# Patient Record
Sex: FEMALE | URBAN - METROPOLITAN AREA
[De-identification: names, ages, dates, MRNs, and addresses within clinical notes are randomized per-mention and may not be internally consistent; named-entity substitution may affect disease eponyms.]

---

## 2020-03-03 ENCOUNTER — APPOINTMENT (RX ONLY)
Dept: URBAN - METROPOLITAN AREA CLINIC 68 | Facility: CLINIC | Age: 65
Setting detail: DERMATOLOGY
End: 2020-03-03

## 2020-03-03 VITALS — DIASTOLIC BLOOD PRESSURE: 80 MMHG | SYSTOLIC BLOOD PRESSURE: 120 MMHG | HEIGHT: 67 IN | WEIGHT: 145 LBS

## 2020-03-03 DIAGNOSIS — Z41.9 ENCOUNTER FOR PROCEDURE FOR PURPOSES OTHER THAN REMEDYING HEALTH STATE, UNSPECIFIED: ICD-10-CM

## 2020-03-03 PROCEDURE — ? COSMETIC CONSULTATION: GENERAL

## 2020-03-03 PROCEDURE — ? COSMETIC CONSULTATION: MICRONEEDLING

## 2020-03-03 PROCEDURE — ? COSMETIC CONSULTATION: LASER RESURFACING

## 2020-03-27 ENCOUNTER — APPOINTMENT (RX ONLY)
Dept: URBAN - METROPOLITAN AREA CLINIC 68 | Facility: CLINIC | Age: 65
Setting detail: DERMATOLOGY
End: 2020-03-27

## 2020-03-27 VITALS — TEMPERATURE: 97.7 F

## 2020-03-27 DIAGNOSIS — Z41.9 ENCOUNTER FOR PROCEDURE FOR PURPOSES OTHER THAN REMEDYING HEALTH STATE, UNSPECIFIED: ICD-10-CM

## 2020-03-27 PROCEDURE — ? FRACTIONAL ERBIUM ABLATIVE LASER

## 2020-03-27 ASSESSMENT — LOCATION ZONE DERM
LOCATION ZONE: NOSE
LOCATION ZONE: FACE

## 2020-03-27 ASSESSMENT — LOCATION DETAILED DESCRIPTION DERM
LOCATION DETAILED: NASAL DORSUM
LOCATION DETAILED: LEFT CENTRAL MALAR CHEEK
LOCATION DETAILED: RIGHT CHIN
LOCATION DETAILED: RIGHT CENTRAL MALAR CHEEK
LOCATION DETAILED: SUPERIOR MID FOREHEAD

## 2020-03-27 ASSESSMENT — LOCATION SIMPLE DESCRIPTION DERM
LOCATION SIMPLE: LEFT CHEEK
LOCATION SIMPLE: RIGHT CHEEK
LOCATION SIMPLE: CHIN
LOCATION SIMPLE: SUPERIOR FOREHEAD
LOCATION SIMPLE: NOSE

## 2020-03-27 NOTE — PROCEDURE: FRACTIONAL ERBIUM ABLATIVE LASER
Treatment Site: full face
Detail Level: Zone
Post-Care Instructions: I reviewed with the patient in detail post-care instructions. Patient is to apply vaseline with a q-tip to all crusted areas, and avoid picking at any scabs. Pt should stay away from the sun and wear sun protection until fully healed.
Pre-Procedure Text: After consent was obtained and the procedure was explained, topical numbing was applied to the areas to be treated.  Before beginning the procedure, all persons present in the exam room put on their protective eyewear.
Consent: Written consent obtained, risks reviewed including but not limited to crusting, scabbing, blistering, scarring, darker or lighter pigmentary change, and/or incomplete removal.
External Cooling Fan Speed: 5
Procedural Text: The treatment areas where then treated as noted above.  The settings used are identified in the photo of the control panel.
Post-Procedure Text: Following the treatment, cold distilled water and a topical emollient was applied to the treatment area.  Post-care instructions were reviewed and given to the patient.

## 2020-10-13 ENCOUNTER — APPOINTMENT (RX ONLY)
Dept: URBAN - METROPOLITAN AREA CLINIC 68 | Facility: CLINIC | Age: 65
Setting detail: DERMATOLOGY
End: 2020-10-13

## 2020-10-13 DIAGNOSIS — Z41.9 ENCOUNTER FOR PROCEDURE FOR PURPOSES OTHER THAN REMEDYING HEALTH STATE, UNSPECIFIED: ICD-10-CM

## 2020-10-13 PROCEDURE — ? MICRODERMABRASION

## 2020-10-13 ASSESSMENT — LOCATION DETAILED DESCRIPTION DERM
LOCATION DETAILED: RIGHT LATERAL BUCCAL CHEEK
LOCATION DETAILED: RIGHT INFERIOR CENTRAL MALAR CHEEK
LOCATION DETAILED: RIGHT SUPERIOR CENTRAL BUCCAL CHEEK
LOCATION DETAILED: SUBMENTAL CHIN
LOCATION DETAILED: RIGHT INFERIOR FOREHEAD
LOCATION DETAILED: RIGHT INFERIOR MEDIAL MALAR CHEEK
LOCATION DETAILED: LEFT SUPERIOR MEDIAL FOREHEAD
LOCATION DETAILED: RIGHT LATERAL MALAR CHEEK
LOCATION DETAILED: GLABELLA
LOCATION DETAILED: RIGHT LATERAL SUBMANDIBULAR CHEEK
LOCATION DETAILED: RIGHT SUPERIOR ANTERIOR NECK
LOCATION DETAILED: LEFT INFERIOR LATERAL MALAR CHEEK
LOCATION DETAILED: RIGHT CENTRAL ZYGOMA
LOCATION DETAILED: LEFT SUPERIOR CENTRAL MALAR CHEEK
LOCATION DETAILED: LEFT SUPERIOR LATERAL NECK
LOCATION DETAILED: RIGHT NASAL SIDEWALL
LOCATION DETAILED: LEFT LATERAL EYEBROW
LOCATION DETAILED: LEFT INFERIOR MEDIAL MALAR CHEEK
LOCATION DETAILED: RIGHT INFERIOR CENTRAL BUCCAL CHEEK
LOCATION DETAILED: LEFT NASAL SIDEWALL
LOCATION DETAILED: RIGHT CENTRAL LATERAL NECK
LOCATION DETAILED: LEFT LATERAL MALAR CHEEK
LOCATION DETAILED: RIGHT CENTRAL ANTERIOR NECK
LOCATION DETAILED: LEFT INFERIOR CENTRAL BUCCAL CHEEK
LOCATION DETAILED: RIGHT CENTRAL BUCCAL CHEEK
LOCATION DETAILED: RIGHT CENTRAL TEMPLE
LOCATION DETAILED: RIGHT SUPERIOR MEDIAL FOREHEAD
LOCATION DETAILED: LEFT SUPERIOR ANTERIOR NECK
LOCATION DETAILED: LEFT CENTRAL TEMPLE
LOCATION DETAILED: LEFT MEDIAL FOREHEAD
LOCATION DETAILED: LEFT LATERAL FOREHEAD
LOCATION DETAILED: LEFT SUBMANDIBULAR AREA
LOCATION DETAILED: LEFT CENTRAL LATERAL NECK
LOCATION DETAILED: SUPERIOR MID FOREHEAD
LOCATION DETAILED: RIGHT MEDIAL ZYGOMA
LOCATION DETAILED: LEFT CENTRAL ANTERIOR NECK
LOCATION DETAILED: RIGHT LATERAL FOREHEAD
LOCATION DETAILED: LEFT MEDIAL MALAR CHEEK
LOCATION DETAILED: RIGHT LOWER CUTANEOUS LIP
LOCATION DETAILED: RIGHT MENTAL CREASE
LOCATION DETAILED: RIGHT MEDIAL FOREHEAD
LOCATION DETAILED: RIGHT INFERIOR MEDIAL FOREHEAD
LOCATION DETAILED: RIGHT CENTRAL MALAR CHEEK
LOCATION DETAILED: LEFT SUPERIOR CENTRAL BUCCAL CHEEK
LOCATION DETAILED: RIGHT SUBMANDIBULAR AREA
LOCATION DETAILED: LEFT MEDIAL BUCCAL CHEEK

## 2020-10-13 ASSESSMENT — LOCATION SIMPLE DESCRIPTION DERM
LOCATION SIMPLE: SUBMENTAL CHIN
LOCATION SIMPLE: GLABELLA
LOCATION SIMPLE: RIGHT NOSE
LOCATION SIMPLE: LEFT NOSE
LOCATION SIMPLE: RIGHT LIP
LOCATION SIMPLE: RIGHT ANTERIOR NECK
LOCATION SIMPLE: LEFT CHEEK
LOCATION SIMPLE: CHIN
LOCATION SIMPLE: LEFT FOREHEAD
LOCATION SIMPLE: SUPERIOR FOREHEAD
LOCATION SIMPLE: NECK
LOCATION SIMPLE: RIGHT FOREHEAD
LOCATION SIMPLE: LEFT TEMPLE
LOCATION SIMPLE: LEFT EYEBROW
LOCATION SIMPLE: RIGHT ZYGOMA
LOCATION SIMPLE: LEFT SUBMANDIBULAR AREA
LOCATION SIMPLE: RIGHT CHEEK
LOCATION SIMPLE: RIGHT TEMPLE
LOCATION SIMPLE: RIGHT SUBMANDIBULAR AREA
LOCATION SIMPLE: LEFT ANTERIOR NECK

## 2020-10-13 ASSESSMENT — LOCATION ZONE DERM
LOCATION ZONE: NOSE
LOCATION ZONE: FACE
LOCATION ZONE: NECK
LOCATION ZONE: LIP

## 2020-10-13 NOTE — PROCEDURE: MICRODERMABRASION
Number Of Passes: 4
Treatment Number: 1
Post-Care Instructions: I reviewed with the patient in detail post-care instructions. Patient should stay away from the sun and wear sun protection until treated areas are fully healed.
Prep Text: The patient's skin was cleaned and prepped.
Indication: skin texture
Vacuum Pressure: 10
Consent: Written consent obtained, risks reviewed including but not limited to crusting, scabbing, blistering, scarring, darker or lighter pigmentary change, bruising, and/or incomplete response.
Detail Level: Zone

## 2020-10-27 ENCOUNTER — APPOINTMENT (RX ONLY)
Dept: URBAN - METROPOLITAN AREA CLINIC 68 | Facility: CLINIC | Age: 65
Setting detail: DERMATOLOGY
End: 2020-10-27

## 2020-10-27 DIAGNOSIS — Z41.9 ENCOUNTER FOR PROCEDURE FOR PURPOSES OTHER THAN REMEDYING HEALTH STATE, UNSPECIFIED: ICD-10-CM

## 2020-10-27 PROCEDURE — ? DIAMONDGLOW

## 2020-10-27 ASSESSMENT — LOCATION ZONE DERM
LOCATION ZONE: FACE
LOCATION ZONE: EYELID

## 2020-10-27 ASSESSMENT — LOCATION DETAILED DESCRIPTION DERM
LOCATION DETAILED: LEFT MEDIAL SUPERIOR EYELID
LOCATION DETAILED: RIGHT INFERIOR MEDIAL FOREHEAD
LOCATION DETAILED: LEFT MEDIAL MALAR CHEEK

## 2020-10-27 ASSESSMENT — LOCATION SIMPLE DESCRIPTION DERM
LOCATION SIMPLE: RIGHT FOREHEAD
LOCATION SIMPLE: LEFT SUPERIOR EYELID
LOCATION SIMPLE: LEFT CHEEK

## 2020-10-27 NOTE — PROCEDURE: DIAMONDGLOW
Consent: Written consent obtained, risks reviewed including but not limited to crusting, scabbing, blistering, scarring, darker or lighter pigmentary change, bruising, and/or incomplete response.
Facial Treatment Head Used?: Facial: Medium-Fine (100 grit) 6 mm
Intelliflow Setting: 100%
Number Of Passes: 4
Detail Level: Zone
Post-Care Instructions: I reviewed with the patient in detail post-care instructions. Patient should stay away from the sun and wear sun protection until treated areas are fully healed.
Solution Used: Skin Hydrating Solution
Body Treatment Head Used?: Not Used
Vacuum Pressure In Inches: 10

## 2020-11-24 ENCOUNTER — APPOINTMENT (RX ONLY)
Dept: URBAN - METROPOLITAN AREA CLINIC 68 | Facility: CLINIC | Age: 65
Setting detail: DERMATOLOGY
End: 2020-11-24

## 2021-10-19 ENCOUNTER — APPOINTMENT (RX ONLY)
Dept: URBAN - METROPOLITAN AREA CLINIC 68 | Facility: CLINIC | Age: 66
Setting detail: DERMATOLOGY
End: 2021-10-19

## 2021-10-19 DIAGNOSIS — Z41.9 ENCOUNTER FOR PROCEDURE FOR PURPOSES OTHER THAN REMEDYING HEALTH STATE, UNSPECIFIED: ICD-10-CM

## 2021-10-19 PROCEDURE — ? PDO THREAD LIFT

## 2021-10-19 NOTE — PROCEDURE: PDO THREAD LIFT
Consent: The risks and benefits of the procedure were discussed with the patient.  Specifically the risks of swelling, bruising, bleeding, discomfort, infection, damage to nerves, numbness, allergic reactions, pigment changes, partial correction, rippling or dimpling, asymmetry, redness, bumps or nodules, visibility of threads, and scarring were reviewed. After this, consent was obtained.
Postcare Statement Text: There were no complications and the patient was given postcare instructions and ice packs.
Detail Level: Zone
Number Of Threads: 1
Anesthesia Method: local infiltration
Thread Size(S) (Optional): see diagram
Anesthesia Type: 1% lidocaine with epinephrine
Total Anesthesia Volume In Cc (Optional): 0
Post-Care Instructions (For The Patient Hand-Out): I reviewed with the patient in detail post-care instructions. Patient should apply ice packs multiple times a day for a couple days. They were instructed to call if they have any problems.
Procedure Text: An 18 gauge needle was used to create the insertions points and the [employer] needles with absorbable sutures were inserted subcutaneously in each direction and advanced through to the exit points on either side. The threads were then tightened and cut adjacent to the exit points and allowed to retract into the subcutaneous space.
Pre-Procedure Text: The treatment areas were cleaned with alcohol and chlorhexidine. Insertion and exit points were mapped out with the Silhouette ruler and marked with a surgical marker.

## 2021-12-07 ENCOUNTER — APPOINTMENT (RX ONLY)
Dept: URBAN - METROPOLITAN AREA CLINIC 68 | Facility: CLINIC | Age: 66
Setting detail: DERMATOLOGY
End: 2021-12-07

## 2021-12-07 DIAGNOSIS — Z41.9 ENCOUNTER FOR PROCEDURE FOR PURPOSES OTHER THAN REMEDYING HEALTH STATE, UNSPECIFIED: ICD-10-CM

## 2021-12-07 PROCEDURE — ? PDO THREAD LIFT

## 2021-12-07 NOTE — PROCEDURE: PDO THREAD LIFT
Pre-Procedure Text: The treatment areas were cleaned with alcohol and chlorhexidine. Insertion and exit points were mapped out with the Silhouette ruler and marked with a surgical marker.
Thread Size(S) (Optional): see diagram
Detail Level: Zone
Price (Use Numbers Only, No Special Characters Or $): 0
Number Of Threads: 1
Procedure Text: An 18 gauge needle was used to create the insertions points and the [employer] needles with absorbable sutures were inserted subcutaneously in each direction and advanced through to the exit points on either side. The threads were then tightened and cut adjacent to the exit points and allowed to retract into the subcutaneous space.
Consent: The risks and benefits of the procedure were discussed with the patient.  Specifically the risks of swelling, bruising, bleeding, discomfort, infection, damage to nerves, numbness, allergic reactions, pigment changes, partial correction, rippling or dimpling, asymmetry, redness, bumps or nodules, visibility of threads, and scarring were reviewed. After this, consent was obtained.
Anesthesia Method: local infiltration
Postcare Statement Text: There were no complications and the patient was given postcare instructions and ice packs.
Anesthesia Type: 1% lidocaine with epinephrine
Post-Care Instructions (For The Patient Hand-Out): I reviewed with the patient in detail post-care instructions. Patient should apply ice packs multiple times a day for a couple days. They were instructed to call if they have any problems.

## 2023-03-30 ENCOUNTER — APPOINTMENT (RX ONLY)
Dept: URBAN - METROPOLITAN AREA CLINIC 68 | Facility: CLINIC | Age: 68
Setting detail: DERMATOLOGY
End: 2023-03-30

## 2023-03-30 DIAGNOSIS — Z41.9 ENCOUNTER FOR PROCEDURE FOR PURPOSES OTHER THAN REMEDYING HEALTH STATE, UNSPECIFIED: ICD-10-CM

## 2023-03-30 PROCEDURE — ? FILLERS

## 2023-03-30 NOTE — PROCEDURE: FILLERS
Bellafill Syringe Price (Per 0.8 Cc- Numbers Only No Special Characters Or $): 0.00
Cheeks Filler Volume In Cc: 0
Consent: Written consent obtained. Risks include but not limited to bruising, beading, irregular texture, ulceration, infection, allergic reaction, scar formation, incomplete augmentation, temporary nature, procedural pain.
Include Cannula Information In Note?: No
Detail Level: Detailed
Pricing Information: This plan will add up the cumulative amount of filler you document and will bill based on the unit price noted below. For example if you inject 0.9 ccs of Restylane it will bill for one unit. If you inject 1.3 ccs it will bill for two units.
Anesthesia Type: 1% lidocaine with epinephrine
Pre-Treatment: Skin was cleaned with alcohol prior to injections.
Filler: RHA 1
Post-Care Instructions: Patient instructed to apply ice to reduce swelling.
Anesthesia Volume In Cc: 0.5
Map Statment: See Attach Map for Complete Details
Additional Anesthesia Volume In Cc: 6

## 2023-10-19 ENCOUNTER — APPOINTMENT (RX ONLY)
Dept: URBAN - METROPOLITAN AREA CLINIC 68 | Facility: CLINIC | Age: 68
Setting detail: DERMATOLOGY
End: 2023-10-19

## 2023-10-19 DIAGNOSIS — Z41.9 ENCOUNTER FOR PROCEDURE FOR PURPOSES OTHER THAN REMEDYING HEALTH STATE, UNSPECIFIED: ICD-10-CM

## 2023-10-19 PROCEDURE — ? SMOOTH THREADS

## 2023-10-19 NOTE — PROCEDURE: SMOOTH THREADS
Add Another Thread?: no
Pre-Procedure Text: The treatment areas were cleaned with alcohol and chlorhexidine. Insertion and exit points were mapped out with the ruler and marked with a surgical marker.
Anesthesia Volume In Cc (Optional): 0
Thread Size And Type: Trisha JEFFERSON 29G x 38mm
Detail Level: Zone
Postcare Statement Text: There were no complications and the patient was given postcare instructions and ice packs.
Consent was obtained.  The risks and benefits of the procedure were discussed with the patient.  Specifically the risks of swelling, bruising, bleeding, discomfort, infection, damage to nerves, numbness, allergic reactions, pigment changes, partial correction, rippling or dimpling, asymmetry, redness, bumps or nodules, visibility of threads, and scarring were reviewed.
Number Of Threads: 1
Second Anesthesia Type: 1% lidocaine with epinephrine
Number Of Threads: 10
Post-Care Instructions (For The Patient Hand-Out): I reviewed with the patient in detail post-care instructions. Patient should apply ice packs multiple times a day for a couple days. They were instructed to call if they have any problems.
Procedure Text: An 18 gauge needle was used to create the insertions points and the thread needles with absorbable sutures were inserted subcutaneously in each direction and advanced through to the exit points on either side. The threads were then tightened and cut adjacent to the exit points and allowed to retract into the subcutaneous space.